# Patient Record
Sex: FEMALE | Race: OTHER | HISPANIC OR LATINO
[De-identification: names, ages, dates, MRNs, and addresses within clinical notes are randomized per-mention and may not be internally consistent; named-entity substitution may affect disease eponyms.]

---

## 2021-08-26 PROBLEM — Z00.00 ENCOUNTER FOR PREVENTIVE HEALTH EXAMINATION: Status: ACTIVE | Noted: 2021-08-26

## 2021-09-24 ENCOUNTER — APPOINTMENT (OUTPATIENT)
Dept: PLASTIC SURGERY | Facility: CLINIC | Age: 31
End: 2021-09-24
Payer: COMMERCIAL

## 2021-09-24 DIAGNOSIS — Z21 ASYMPTOMATIC HUMAN IMMUNODEFICIENCY VIRUS [HIV] INFECTION STATUS: ICD-10-CM

## 2021-09-24 PROCEDURE — 99203 OFFICE O/P NEW LOW 30 MIN: CPT

## 2021-09-29 PROBLEM — Z21 HIV POSITIVE: Status: ACTIVE | Noted: 2021-09-29

## 2021-09-29 RX ORDER — SPIRONOLACTONE 50 MG/1
TABLET ORAL
Refills: 0 | Status: ACTIVE | COMMUNITY

## 2021-09-29 RX ORDER — BICTEGRAVIR SODIUM, EMTRICITABINE, AND TENOFOVIR ALAFENAMIDE FUMARATE 30; 120; 15 MG/1; MG/1; MG/1
TABLET ORAL
Refills: 0 | Status: ACTIVE | COMMUNITY

## 2021-09-29 RX ORDER — ESTRADIOL VALERATE 20 MG/ML
20 INJECTION INTRAMUSCULAR
Refills: 0 | Status: ACTIVE | COMMUNITY

## 2021-09-29 NOTE — HISTORY OF PRESENT ILLNESS
[FreeTextEntry1] : 32yo transgender male to female patient with a history of gender dysphoria presents for consultation for facial feminization.  Patient reports that she has been socially transitioning since March 2019 and she has been medically transitioning, taking hormones since April 2019.  She is currently in transgender care at [facility].  Patient had a tracheal shave and vocal cord surgery August 19, 2021 at [facility].  She denies other gender affirming surgeries and procedures.  Past surgical history includes an appendectomy and wisdom tooth removal.  Patient denies problems with anesthesia or clots in the past.  There is no pertinent family medical history.  Her past medical history includes HIV which was diagnosed in 2014.  Currently her viral load is undetectable.  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  She denies the use of Tobacco, marijuana, and other illicit drugs.  She reports occasional social alcohol use.\par

## 2021-10-01 ENCOUNTER — APPOINTMENT (OUTPATIENT)
Dept: PLASTIC SURGERY | Facility: CLINIC | Age: 31
End: 2021-10-01

## 2021-10-15 ENCOUNTER — RESULT REVIEW (OUTPATIENT)
Age: 31
End: 2021-10-15

## 2021-10-15 ENCOUNTER — APPOINTMENT (OUTPATIENT)
Dept: CT IMAGING | Facility: CLINIC | Age: 31
End: 2021-10-15
Payer: COMMERCIAL

## 2021-10-15 ENCOUNTER — OUTPATIENT (OUTPATIENT)
Dept: OUTPATIENT SERVICES | Facility: HOSPITAL | Age: 31
LOS: 1 days | End: 2021-10-15

## 2021-10-15 PROCEDURE — 70486 CT MAXILLOFACIAL W/O DYE: CPT | Mod: 26

## 2021-11-16 ENCOUNTER — TRANSCRIPTION ENCOUNTER (OUTPATIENT)
Age: 31
End: 2021-11-16

## 2021-11-19 ENCOUNTER — APPOINTMENT (OUTPATIENT)
Dept: PLASTIC SURGERY | Facility: CLINIC | Age: 31
End: 2021-11-19
Payer: COMMERCIAL

## 2021-11-19 PROCEDURE — 99213 OFFICE O/P EST LOW 20 MIN: CPT

## 2022-01-07 ENCOUNTER — APPOINTMENT (OUTPATIENT)
Dept: PLASTIC SURGERY | Facility: CLINIC | Age: 32
End: 2022-01-07
Payer: COMMERCIAL

## 2022-01-07 PROCEDURE — 99213 OFFICE O/P EST LOW 20 MIN: CPT

## 2022-01-07 NOTE — HISTORY OF PRESENT ILLNESS
[FreeTextEntry1] : 30yo transgender male to female patient with a history of gender dysphoria presents to discuss surgical plan prior to FFS scheduled for 2-14-21. no changes to PMH. \par plan for fat grafting to zygoma, \par genioplasty, reductive 6mm and advance\par open rhinoplasty\par brow lift \par \par \par 20 min spent discussing surgical plan\par

## 2022-01-20 ENCOUNTER — APPOINTMENT (OUTPATIENT)
Dept: PLASTIC SURGERY | Facility: CLINIC | Age: 32
End: 2022-01-20
Payer: COMMERCIAL

## 2022-01-20 VITALS
SYSTOLIC BLOOD PRESSURE: 106 MMHG | HEIGHT: 65 IN | WEIGHT: 121 LBS | TEMPERATURE: 97.8 F | RESPIRATION RATE: 16 BRPM | OXYGEN SATURATION: 99 % | DIASTOLIC BLOOD PRESSURE: 72 MMHG | HEART RATE: 74 BPM | BODY MASS INDEX: 20.16 KG/M2

## 2022-01-20 PROCEDURE — 99214 OFFICE O/P EST MOD 30 MIN: CPT

## 2022-01-25 ENCOUNTER — TRANSCRIPTION ENCOUNTER (OUTPATIENT)
Age: 32
End: 2022-01-25

## 2022-01-28 ENCOUNTER — TRANSCRIPTION ENCOUNTER (OUTPATIENT)
Age: 32
End: 2022-01-28

## 2022-02-25 ENCOUNTER — APPOINTMENT (OUTPATIENT)
Dept: PLASTIC SURGERY | Facility: CLINIC | Age: 32
End: 2022-02-25
Payer: COMMERCIAL

## 2022-02-25 PROCEDURE — 99212 OFFICE O/P EST SF 10 MIN: CPT

## 2022-02-25 NOTE — HISTORY OF PRESENT ILLNESS
[FreeTextEntry1] : 32yo transgender female with FFS date planned for 4/22\par pt interested in adding forehead, orbital remodeling and brow lift\par these areas are also a source of dysphoria for her\par \par no change in pmh/psh\par nkda

## 2022-03-31 ENCOUNTER — APPOINTMENT (OUTPATIENT)
Dept: PLASTIC SURGERY | Facility: CLINIC | Age: 32
End: 2022-03-31
Payer: COMMERCIAL

## 2022-03-31 VITALS
WEIGHT: 124 LBS | OXYGEN SATURATION: 99 % | RESPIRATION RATE: 16 BRPM | BODY MASS INDEX: 20.66 KG/M2 | HEART RATE: 82 BPM | SYSTOLIC BLOOD PRESSURE: 105 MMHG | DIASTOLIC BLOOD PRESSURE: 63 MMHG | TEMPERATURE: 98.4 F | HEIGHT: 65 IN

## 2022-03-31 PROCEDURE — 99214 OFFICE O/P EST MOD 30 MIN: CPT

## 2022-03-31 NOTE — PHYSICAL EXAM
[de-identified] : NAD [de-identified] : Normal respiratory effort [de-identified] : Deferred.  Prior photographs reviewed.  Patient endorses no changes.

## 2022-03-31 NOTE — HISTORY OF PRESENT ILLNESS
[FreeTextEntry1] : The patient returns for further discussion of implant type and technique.  She is interested in 250 to 300 cc silicone implants, with a style on the softer side.  We reviewed the patient decision checklist in detail.

## 2022-03-31 NOTE — ASSESSMENT
[FreeTextEntry1] : The patient is interested in breast augmentation with silicone implants.  Plan for 250-300 cc. Awaiting labs from PMD not in screenshot format.

## 2022-04-08 VITALS
RESPIRATION RATE: 16 BRPM | TEMPERATURE: 98 F | DIASTOLIC BLOOD PRESSURE: 72 MMHG | HEIGHT: 65 IN | HEART RATE: 84 BPM | WEIGHT: 123.9 LBS | SYSTOLIC BLOOD PRESSURE: 104 MMHG

## 2022-04-08 NOTE — ASU PATIENT PROFILE, ADULT - BILL OF RIGHTS/ADMISSION INFORMATION PROVIDED TO:
Patient request, last OV 1/15/19, next scheduled 4/15/19. Requested Prescriptions     Pending Prescriptions Disp Refills    hydroCHLOROthiazide (HYDRODIURIL) 12.5 mg tablet       Sig: Take 1 Tab by mouth daily.  pravastatin (PRAVACHOL) 20 mg tablet       Sig: Take 1 Tab by mouth nightly. Patient

## 2022-04-08 NOTE — ASU PATIENT PROFILE, ADULT - NSICDXPASTSURGICALHX_GEN_ALL_CORE_FT
PAST SURGICAL HISTORY:  Elective surgery Wendler glottoplasty    Elective surgery Tracheal shave; voice femenization surgery    S/P appy

## 2022-04-10 ENCOUNTER — TRANSCRIPTION ENCOUNTER (OUTPATIENT)
Age: 32
End: 2022-04-10

## 2022-04-11 ENCOUNTER — APPOINTMENT (OUTPATIENT)
Dept: PLASTIC SURGERY | Facility: HOSPITAL | Age: 32
End: 2022-04-11
Payer: COMMERCIAL

## 2022-04-11 ENCOUNTER — INPATIENT (INPATIENT)
Facility: HOSPITAL | Age: 32
LOS: 0 days | Discharge: HOME | DRG: 876 | End: 2022-04-12
Attending: SURGERY | Admitting: SURGERY
Payer: COMMERCIAL

## 2022-04-11 DIAGNOSIS — Z90.49 ACQUIRED ABSENCE OF OTHER SPECIFIED PARTS OF DIGESTIVE TRACT: Chronic | ICD-10-CM

## 2022-04-11 DIAGNOSIS — Z41.9 ENCOUNTER FOR PROCEDURE FOR PURPOSES OTHER THAN REMEDYING HEALTH STATE, UNSPECIFIED: Chronic | ICD-10-CM

## 2022-04-11 LAB — SARS-COV-2 N GENE NPH QL NAA+PROBE: NOT DETECTED

## 2022-04-11 PROCEDURE — 21172 RCNST SUPR-LAT ORB RM&LW FHD: CPT

## 2022-04-11 PROCEDURE — 21122 GENIOP SLDG OSTEOT 2/>: CPT

## 2022-04-11 PROCEDURE — 30410 RECONSTRUCTION OF NOSE: CPT

## 2022-04-11 PROCEDURE — 21256 RECONSTRUCTION OF ORBIT: CPT

## 2022-04-11 PROCEDURE — 21139 RDCTJ FOREHEAD CNTRG&SETBACK: CPT

## 2022-04-11 PROCEDURE — 15773 GRFG AUTOL FAT LIPO 25 CC/<: CPT

## 2022-04-11 PROCEDURE — 30520 REPAIR OF NASAL SEPTUM: CPT

## 2022-04-11 PROCEDURE — 67900 REPAIR BROW DEFECT: CPT

## 2022-04-11 DEVICE — GUIDE MARKING CRANIAL: Type: IMPLANTABLE DEVICE | Status: FUNCTIONAL

## 2022-04-11 DEVICE — GUIDE MARKING TRANSFORM XS: Type: IMPLANTABLE DEVICE | Status: FUNCTIONAL

## 2022-04-11 DEVICE — GUIDE CUTTING W/PLAN TRANSFORM: Type: IMPLANTABLE DEVICE | Status: FUNCTIONAL

## 2022-04-11 DEVICE — SCREW CR DRV 2.0X11MM: Type: IMPLANTABLE DEVICE | Status: FUNCTIONAL

## 2022-04-11 DEVICE — GUIDE CUTTING TRANSFORM XS: Type: IMPLANTABLE DEVICE | Status: FUNCTIONAL

## 2022-04-11 DEVICE — IMP CUSTOM IPS TI 67MM: Type: IMPLANTABLE DEVICE | Status: FUNCTIONAL

## 2022-04-11 RX ORDER — HYDROMORPHONE HYDROCHLORIDE 2 MG/ML
0.25 INJECTION INTRAMUSCULAR; INTRAVENOUS; SUBCUTANEOUS
Refills: 0 | Status: DISCONTINUED | OUTPATIENT
Start: 2022-04-11 | End: 2022-04-12

## 2022-04-11 RX ORDER — SODIUM CHLORIDE 9 MG/ML
1000 INJECTION, SOLUTION INTRAVENOUS
Refills: 0 | Status: DISCONTINUED | OUTPATIENT
Start: 2022-04-11 | End: 2022-04-12

## 2022-04-11 RX ORDER — KETAMINE HYDROCHLORIDE 100 MG/ML
50 INJECTION INTRAMUSCULAR; INTRAVENOUS ONCE
Refills: 0 | Status: DISCONTINUED | OUTPATIENT
Start: 2022-04-11 | End: 2022-04-12

## 2022-04-11 RX ORDER — CEFAZOLIN SODIUM 1 G
2000 VIAL (EA) INJECTION EVERY 8 HOURS
Refills: 0 | Status: DISCONTINUED | OUTPATIENT
Start: 2022-04-11 | End: 2022-04-11

## 2022-04-11 RX ORDER — OXYCODONE HYDROCHLORIDE 5 MG/1
10 TABLET ORAL EVERY 12 HOURS
Refills: 0 | Status: DISCONTINUED | OUTPATIENT
Start: 2022-04-11 | End: 2022-04-12

## 2022-04-11 RX ORDER — CEFAZOLIN SODIUM 1 G
2000 VIAL (EA) INJECTION EVERY 8 HOURS
Refills: 0 | Status: DISCONTINUED | OUTPATIENT
Start: 2022-04-11 | End: 2022-04-12

## 2022-04-11 RX ORDER — OXYCODONE HYDROCHLORIDE 5 MG/1
5 TABLET ORAL EVERY 4 HOURS
Refills: 0 | Status: DISCONTINUED | OUTPATIENT
Start: 2022-04-11 | End: 2022-04-12

## 2022-04-11 RX ORDER — CHLORHEXIDINE GLUCONATE 213 G/1000ML
15 SOLUTION TOPICAL
Refills: 0 | Status: DISCONTINUED | OUTPATIENT
Start: 2022-04-11 | End: 2022-04-12

## 2022-04-11 RX ORDER — ONDANSETRON 8 MG/1
4 TABLET, FILM COATED ORAL EVERY 8 HOURS
Refills: 0 | Status: DISCONTINUED | OUTPATIENT
Start: 2022-04-11 | End: 2022-04-12

## 2022-04-11 RX ORDER — ACETAMINOPHEN 500 MG
975 TABLET ORAL EVERY 6 HOURS
Refills: 0 | Status: DISCONTINUED | OUTPATIENT
Start: 2022-04-11 | End: 2022-04-12

## 2022-04-11 RX ORDER — BENZOCAINE AND MENTHOL 5; 1 G/100ML; G/100ML
1 LIQUID ORAL THREE TIMES A DAY
Refills: 0 | Status: DISCONTINUED | OUTPATIENT
Start: 2022-04-11 | End: 2022-04-12

## 2022-04-11 RX ORDER — METOCLOPRAMIDE HCL 10 MG
5 TABLET ORAL EVERY 6 HOURS
Refills: 0 | Status: DISCONTINUED | OUTPATIENT
Start: 2022-04-11 | End: 2022-04-12

## 2022-04-11 RX ORDER — DEXAMETHASONE 0.5 MG/5ML
10 ELIXIR ORAL EVERY 12 HOURS
Refills: 0 | Status: DISCONTINUED | OUTPATIENT
Start: 2022-04-11 | End: 2022-04-12

## 2022-04-11 RX ADMIN — HYDROMORPHONE HYDROCHLORIDE 0.25 MILLIGRAM(S): 2 INJECTION INTRAMUSCULAR; INTRAVENOUS; SUBCUTANEOUS at 13:40

## 2022-04-11 RX ADMIN — Medication 5 MILLIGRAM(S): at 18:28

## 2022-04-11 RX ADMIN — CHLORHEXIDINE GLUCONATE 15 MILLILITER(S): 213 SOLUTION TOPICAL at 17:31

## 2022-04-11 RX ADMIN — Medication 975 MILLIGRAM(S): at 23:35

## 2022-04-11 RX ADMIN — Medication 102 MILLIGRAM(S): at 17:30

## 2022-04-11 RX ADMIN — ONDANSETRON 4 MILLIGRAM(S): 8 TABLET, FILM COATED ORAL at 14:15

## 2022-04-11 RX ADMIN — Medication 2000 MILLIGRAM(S): at 22:07

## 2022-04-11 RX ADMIN — Medication 975 MILLIGRAM(S): at 18:20

## 2022-04-11 RX ADMIN — Medication 975 MILLIGRAM(S): at 17:30

## 2022-04-11 RX ADMIN — HYDROMORPHONE HYDROCHLORIDE 0.25 MILLIGRAM(S): 2 INJECTION INTRAMUSCULAR; INTRAVENOUS; SUBCUTANEOUS at 13:55

## 2022-04-11 NOTE — BRIEF OPERATIVE NOTE - NSICDXBRIEFPROCEDURE_GEN_ALL_CORE_FT
PROCEDURES:  Contouring, forehead, anterior frontal sinus wall, for reduction and setback 11-Apr-2022 12:24:27  Tino Mix  Genioplasty with multiple sliding osteotomies 11-Apr-2022 12:24:36  Tino Mix  Rhinoplasty with repair of nasal septum 11-Apr-2022 12:24:59  Tino Mix

## 2022-04-11 NOTE — BRIEF OPERATIVE NOTE - OPERATION/FINDINGS
frontal bone contouring, brow lift, osseous genioplasty, septo-rhinoplasty, fat grafting from abdomen to face

## 2022-04-12 ENCOUNTER — TRANSCRIPTION ENCOUNTER (OUTPATIENT)
Age: 32
End: 2022-04-12

## 2022-04-12 VITALS
RESPIRATION RATE: 18 BRPM | OXYGEN SATURATION: 96 % | SYSTOLIC BLOOD PRESSURE: 119 MMHG | DIASTOLIC BLOOD PRESSURE: 71 MMHG | TEMPERATURE: 98 F | HEART RATE: 91 BPM

## 2022-04-12 PROCEDURE — C1889: CPT

## 2022-04-12 PROCEDURE — C1713: CPT

## 2022-04-12 RX ORDER — ACETAMINOPHEN 500 MG
3 TABLET ORAL
Qty: 0 | Refills: 0 | DISCHARGE
Start: 2022-04-12

## 2022-04-12 RX ORDER — CHLORHEXIDINE GLUCONATE 213 G/1000ML
15 SOLUTION TOPICAL
Qty: 315 | Refills: 0
Start: 2022-04-12 | End: 2022-04-18

## 2022-04-12 RX ORDER — OXYCODONE HYDROCHLORIDE 5 MG/1
1 TABLET ORAL
Qty: 20 | Refills: 0
Start: 2022-04-12 | End: 2022-04-16

## 2022-04-12 RX ORDER — BICTEGRAVIR SODIUM, EMTRICITABINE, AND TENOFOVIR ALAFENAMIDE FUMARATE 30; 120; 15 MG/1; MG/1; MG/1
1 TABLET ORAL DAILY
Refills: 0 | Status: DISCONTINUED | OUTPATIENT
Start: 2022-04-12 | End: 2022-04-12

## 2022-04-12 RX ORDER — SPIRONOLACTONE 25 MG/1
50 TABLET, FILM COATED ORAL DAILY
Refills: 0 | Status: DISCONTINUED | OUTPATIENT
Start: 2022-04-12 | End: 2022-04-12

## 2022-04-12 RX ADMIN — Medication 102 MILLIGRAM(S): at 05:19

## 2022-04-12 RX ADMIN — Medication 975 MILLIGRAM(S): at 18:38

## 2022-04-12 RX ADMIN — Medication 975 MILLIGRAM(S): at 06:19

## 2022-04-12 RX ADMIN — Medication 975 MILLIGRAM(S): at 18:01

## 2022-04-12 RX ADMIN — Medication 975 MILLIGRAM(S): at 00:35

## 2022-04-12 RX ADMIN — Medication 2000 MILLIGRAM(S): at 05:20

## 2022-04-12 RX ADMIN — Medication 975 MILLIGRAM(S): at 11:34

## 2022-04-12 RX ADMIN — CHLORHEXIDINE GLUCONATE 15 MILLILITER(S): 213 SOLUTION TOPICAL at 05:20

## 2022-04-12 RX ADMIN — Medication 975 MILLIGRAM(S): at 05:19

## 2022-04-12 RX ADMIN — Medication 2000 MILLIGRAM(S): at 13:39

## 2022-04-12 RX ADMIN — Medication 102 MILLIGRAM(S): at 18:01

## 2022-04-12 RX ADMIN — CHLORHEXIDINE GLUCONATE 15 MILLILITER(S): 213 SOLUTION TOPICAL at 18:01

## 2022-04-12 RX ADMIN — Medication 975 MILLIGRAM(S): at 12:04

## 2022-04-12 RX ADMIN — BICTEGRAVIR SODIUM, EMTRICITABINE, AND TENOFOVIR ALAFENAMIDE FUMARATE 1 TABLET(S): 30; 120; 15 TABLET ORAL at 18:07

## 2022-04-12 NOTE — DISCHARGE NOTE PROVIDER - NSDCMRMEDTOKEN_GEN_ALL_CORE_FT
Biktarvy 50 mg-200 mg-25 mg oral tablet: 1 tab(s) orally once a day  estradiol 2 mg oral tablet: orally 3 times a day  propranolol 20 mg oral tablet:   spironolactone 50 mg oral tablet: orally once a day- 4 tabs    acetaminophen 325 mg oral tablet: 3 tab(s) orally every 8 hours  acetaminophen-oxyCODONE 325 mg-5 mg oral tablet: 1 tab(s) orally every 6 hours, As Needed MDD:6  Augmentin 500 mg-125 mg oral tablet: 1 tab(s) orally 2 times a day   Biktarvy 50 mg-200 mg-25 mg oral tablet: 1 tab(s) orally once a day  estradiol 2 mg oral tablet: orally 3 times a day  Medrol Dosepak 4 mg oral tablet: Take medrol dosepak as directed  Peridex 0.12% mucous membrane liquid: 15 milliliter(s) mucous membrane 3 times a day   propranolol 20 mg oral tablet:   spironolactone 50 mg oral tablet: orally once a day- 4 tabs

## 2022-04-12 NOTE — DISCHARGE NOTE PROVIDER - INSTRUCTIONS
Please follow up with Dr. Cabrales within x1 week after discharge from the hospital. You may call [phone] to schedule an appointment.     Medications:  •A prescription for pain medication will be e-prescribed to your pharmacy.  Please take this as needed for severe pain.  •Do not drive while taking the prescribed pain medication.  •Do not take pain medication on an empty stomach, this may make you nauseous.  •Constipation is not uncommon when taking prescription pain medication.  You may take an OTC stool softener like Dulcolax or Sennakot to help with this.  •You may take over the counter pain medication such as Tylenol (acetaminophen) or Advil (ibuprofen) for pain.  •Please use Peridex rinse- swish and spit twice daily for 7 days.  Activity:  •No bending or heavy lifting.  Keep your head above the level of your heart.  At your first post-op visit we will assess how you are doing and will adjust the restrictions as needed.  •Sleep elevated on at least 2 pillows  Diet:  •	Intraoral incisions:  Clear liquid diet for first 48 hours then may advance to full liquid diet for 7 days.  Then advance to soft diet.  Avoid overly hot, cold, spicy, or acidic foods.  •	Do not drink alcohol in the immediate postoperative period and while taking prescription pain medication.  Wound Care:  • Gently brush teeth and keep mouth clean after eating.  At your first post-op visit we will assess the wound and instruct you of any care needed.  •You may shower.  Let soap and water run over the incisions and pat dry.  •You may gently apply ice pack, or frozen peas to the nose and eye regions.  Apply this for the first 48-72 hours.  20 minutes on, 20 minutes off.  •If you were given a facial/chin garment, please wear this 24/7 until your first postop visit

## 2022-04-12 NOTE — PROGRESS NOTE ADULT - SUBJECTIVE AND OBJECTIVE BOX
SUBJECTIVE: Patient seen and examined bedside by chief resident. Patient states doing well overall with minimal pain. States difficult to sleep overnight given the swelling in the face but no issues with breathing. Tolerating CLD.     ceFAZolin  Injectable. 2000 milliGRAM(s) IV Push every 8 hours    MEDICATIONS  (PRN):  benzocaine 15 mG/menthol 3.6 mG Lozenge 1 Lozenge Oral three times a day PRN Sore Throat  HYDROmorphone  Injectable 0.25 milliGRAM(s) IV Push every 15 minutes PRN pacu pain  metoclopramide Injectable 5 milliGRAM(s) IV Push every 6 hours PRN nausea/vomiting  ondansetron Injectable 4 milliGRAM(s) IV Push every 8 hours PRN Nausea and/or Vomiting  oxyCODONE    IR 5 milliGRAM(s) Oral every 4 hours PRN Moderate Pain (4 - 6)  oxyCODONE    IR 10 milliGRAM(s) Oral every 12 hours PRN Severe Pain (7 - 10)      I&O's Detail    11 Apr 2022 07:01  -  12 Apr 2022 06:57  --------------------------------------------------------  IN:    Lactated Ringers: 1200 mL    Oral Fluid: 860 mL  Total IN: 2060 mL    OUT:    Bulb (mL): 28.5 mL    Voided (mL): 1950 mL  Total OUT: 1978.5 mL    Total NET: 81.5 mL          Vital Signs Last 24 Hrs  T(C): 36.4 (12 Apr 2022 04:14), Max: 36.8 (11 Apr 2022 20:10)  T(F): 97.5 (12 Apr 2022 04:14), Max: 98.3 (11 Apr 2022 20:10)  HR: 87 (12 Apr 2022 04:14) (76 - 91)  BP: 125/77 (12 Apr 2022 04:14) (95/59 - 125/77)  BP(mean): 71 (11 Apr 2022 14:38) (71 - 84)  RR: 18 (12 Apr 2022 04:14) (11 - 18)  SpO2: 95% (12 Apr 2022 04:14) (94% - 100%)    PHYSICAL EXAM:   Gen: NAD, resting comfortably in bed sitting up  HEENT: bulky dressing chin and head with minimal SS staining of chin, mild periorbital/facial edema stable, LISBETH x1 SS  Pulm: no respiratory distress on RA  Ext: WWP, mild L hand edema at infiltrated IV site   Neuro: motor/sensory grossly intact

## 2022-04-12 NOTE — PROGRESS NOTE ADULT - ASSESSMENT
30yo s/p FFS, frontal bone contouring, genioplasty, septorhino, fat grafting from belly to face 4/11/22.     - Continue jaw support   - Continue LISBETH drain x1, drain care   - Continue CLD   - OOBA   - Possible discharge this afternoon vs. tomorrow AM pending progress    Plan discussed with attending and chief resident.   ____________________________________________________   [facility] - Resident   Surgery

## 2022-04-12 NOTE — DISCHARGE NOTE PROVIDER - HOSPITAL COURSE
32 yo transgender female underwent facial feminization surgery including frontal bone contouring, genioplasty, septorhinoplasty, and facial fat grafting. Patient tolerated the procedure well and had uneventful postoperative hospital course.  On the day of the discharge, patient was evaluated and deemed in stable condition to be discharged to home. Follow up in one week with Dr. Cabrales in the office.

## 2022-04-12 NOTE — DISCHARGE NOTE PROVIDER - CARE PROVIDER_API CALL
West Cabrales)  Plastic Surgery  [address] [facility], [place] [address]  Phone: [phone]  Fax: [phone]  Follow Up Time: 1 week

## 2022-04-12 NOTE — DISCHARGE NOTE NURSING/CASE MANAGEMENT/SOCIAL WORK - PATIENT PORTAL LINK FT
You can access the FollowMyHealth Patient Portal offered by [facility] by registering at the following website: [email or web address]. By joining [employer]’s FollowMyHealth portal, you will also be able to view your health information using other applications (apps) compatible with our system.

## 2022-04-13 PROBLEM — F64.9 GENDER IDENTITY DISORDER, UNSPECIFIED: Chronic | Status: ACTIVE | Noted: 2022-04-08

## 2022-04-13 PROBLEM — B20 HUMAN IMMUNODEFICIENCY VIRUS [HIV] DISEASE: Chronic | Status: ACTIVE | Noted: 2022-04-08

## 2022-04-18 DIAGNOSIS — F64.0 TRANSSEXUALISM: ICD-10-CM

## 2022-04-22 ENCOUNTER — APPOINTMENT (OUTPATIENT)
Dept: PLASTIC SURGERY | Facility: CLINIC | Age: 32
End: 2022-04-22
Payer: COMMERCIAL

## 2022-04-22 PROCEDURE — 99024 POSTOP FOLLOW-UP VISIT: CPT

## 2022-05-13 ENCOUNTER — APPOINTMENT (OUTPATIENT)
Dept: PLASTIC SURGERY | Facility: CLINIC | Age: 32
End: 2022-05-13
Payer: COMMERCIAL

## 2022-05-13 PROCEDURE — 99024 POSTOP FOLLOW-UP VISIT: CPT

## 2022-06-06 NOTE — HISTORY OF PRESENT ILLNESS
[FreeTextEntry1] : Dop: 4/11/22 status post facial feminization surgery including frontal sinus reduction and setback, medial orbital rim reduction and remodeling, lateral orbital rim osteotomy and remodeling of frontal bone and lateral orbital rim, bilateral brow lift, open septorhinoplasty, fat grafting to cheeks and osseous reductive genioplasty.  No excessive bleeding. No fevers. No odor. No purulent discharge. No excessive pain.\par \par \par

## 2022-06-15 NOTE — PHYSICAL EXAM
[de-identified] : NAD.  BMI 20.1 [de-identified] : Normal respiratory effort [de-identified] : No dominant masses, skin changes, nipple retraction, or palpable axillary lymphadenopathy. SN->N distance is 18.5 cm on the right and 19.5 cm on the left; width is 11 (13.5 ideal) cm on the right and 12 (14 ideal) cm on the left; N->IMF is 5 cm on the right and 5 cm on the left. There is no ptosis.

## 2022-06-15 NOTE — HISTORY OF PRESENT ILLNESS
[FreeTextEntry1] : 31-year-old woman designated male at birth (Paulina; she/her) presents for consultation for breast augmentation.  She has been on feminizing hormones for 2-1/2 years and has noticed minimal breast growth.  She is looking to maintain a natural appearance.  She denies any lumps, bumps, or other recent changes in her breasts.  She has not had any breast imaging.  She denies any family history of breast cancer.  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  She lives alone and states that her partner, Marv, would be able to assist her after surgery.  She denies nicotine use, drinks some alcohol, and denies recreational drug use.  She is HIV positive on Biktarvy and states that her viral load is undetectable.

## 2022-06-15 NOTE — ADDENDUM
[FreeTextEntry1] : 2022-6-15\par \par The patient has labs from April 2022 demonstrating normal CD4 count and undetectable viral load.  We will place orders for insurance submission.

## 2022-06-15 NOTE — ASSESSMENT
[FreeTextEntry1] : The patient is interested in an augmentation of 250 to 350 cc.  She is uncertain if she wants to use saline or silicone implants.  She'll need to return for an additional appointment for further discussion.  We'll also need a copy of her most recent CD4 count and viral load.  She is interested in reviewing photos of prior breast augmentation patients.

## 2022-07-08 ENCOUNTER — APPOINTMENT (OUTPATIENT)
Dept: PLASTIC SURGERY | Facility: CLINIC | Age: 32
End: 2022-07-08

## 2022-07-08 PROCEDURE — 99024 POSTOP FOLLOW-UP VISIT: CPT

## 2022-07-08 RX ORDER — CHLORHEXIDINE GLUCONATE, 0.12% ORAL RINSE 1.2 MG/ML
0.12 SOLUTION DENTAL
Qty: 1 | Refills: 0 | Status: COMPLETED | COMMUNITY
Start: 2022-04-18 | End: 2022-07-08

## 2022-07-08 NOTE — HISTORY OF PRESENT ILLNESS
[FreeTextEntry1] : Dop: 4/11/22 \par s/o facial feminization surgery including frontal sinus reduction and setback, medial orbital rim reduction and remodeling, lateral orbital rim osteotomy and remodeling of frontal bone and lateral orbital rim, bilateral brow lift, open septorhinoplasty, fat grafting to cheeks and osseous reductive genioplasty. No excessive bleeding. No fevers. No odor. No purulent discharge. No excessive pain.\par \par Pt had breast aug June 2020; KARISHMA Fernandez\par interested in discussing mandibular angle reduction and fillers

## 2022-09-09 ENCOUNTER — APPOINTMENT (OUTPATIENT)
Dept: PLASTIC SURGERY | Facility: CLINIC | Age: 32
End: 2022-09-09

## 2022-09-09 VITALS
OXYGEN SATURATION: 98 % | TEMPERATURE: 97.7 F | HEIGHT: 65 IN | BODY MASS INDEX: 20.83 KG/M2 | DIASTOLIC BLOOD PRESSURE: 74 MMHG | WEIGHT: 125 LBS | HEART RATE: 71 BPM | SYSTOLIC BLOOD PRESSURE: 106 MMHG

## 2022-09-09 PROCEDURE — 99213 OFFICE O/P EST LOW 20 MIN: CPT

## 2022-10-01 NOTE — DISCUSSION/SUMMARY
[FreeTextEntry1] : This patient known to us began transitioning in March 2019\par She has been on hormonal therapy consistently since 2019\par She has been in therapy and was diagnosed with Gender Dysphoria \par underwent previous FFS by Dr. Cabrales but still has concerns about certain facial features that appear masculine \par desires facial feminization surgery\par followed by Transgender team\par The following facial features appear masculine and will need to be modified:\par -Brow\par -Jawline (Mandiblular angles)\par \par PSxHx:\par Surgery: FFS (Brow, nose, chin)\par Year 4/11/2022\par Hospital: [facility]\par Surgeon: Dr. ROZ Cabrales\par Complications: None\par \par Surgery: Tracheal shave\par Year 2021\par Hospital: [facility]\par Surgeon: Dr. JENNY Vance\par Complications: None\par \par Surgery: Breast augmentation\par \par Medications: \par -Estrogen\par -Spironolactone 200mg QD\par  \par FH: noncontributory\par  \par SH: marijuana use,  no secondary tobacco use,\par  \par ROS:\par General health / Constitutional\par      no fever, no chills, no unusual weight changes, no energy level changes, no night sweats\par Skin\par       No color or pigmentation changes, no pruritis, no rashes, no ulcers,  \par Hair\par       No changes in color, texture,  distribution, loss\par Nails\par       No color changes, brittleness, infection\par Head\par       No headaches, no new jaw pain\par Eyes\par       Good visual acuity, no color blindness, no corrective lenses, no photophobia, no diplopia, no blurred vision, no infection, pain, no medications, \par Ear\par      no tinnitus, no discharge, no pain, no medications\par Nose\par      no epistaxis, no rhinorrhea, no rhinitis, no pain, \par Mouth & Throat\par      no gingivitis, no gingival bleeding, no ulcers, no voice changes, no changes in oral mucosa or tongue\par Neck\par      no stiffness, no pain, no lymphadenitis, no thyroid disorders, \par Respiratory\par      no cough, no dyspnea, no wheezing,  no chest pain, cyanosis, no pneumonia, no asthma, \par Cardiovascular\par      no chest pain, no palpitations, no irregular rhythm, no tachycardia, no bradycardia, no heart failure, no dyspnea on exertion (GILMAN), no edema\par Gastrointestinal\par      no nausea / vomiting, no dysphagia, no reflux / GERD, no abdominal pain, no jaundice\par Musculoskeletal\par      no pain in muscles, bones, or joints; no fractures, no dislocations, no muscular weakness, no atrophy\par Neurological\par      no paresis, no paralysis, no paresthesia, no seizures, no dizziness, no syncope, no ataxia, no tremor\par Psychological\par      no childhood behavioral problems, no irritability, no sleep changes\par Hematological\par      no anemia, no bruising, no bleeding tendencies, \par  \par PHYSICAL EXAM\par General\par WDWN, in no distress,  A & O x 3 (person, place, time) \par HEENT\par Head: AT/NC (atraumatic, normocephalic), including TMJ, sensory and motor; \par Prominent brow and lateral orbital rim type III\par Eyes: EOMI, PERRLA\par Ears: exterior, nl hearing,\par Nose: slightly deviated to the left\par Throat & mouth: gd palate elevation, nl tongue mobility, nl tonsil size\par Prominent mandibular angle with active masseter\par  \par Neck: no masses, nl pulses\par  \par We had a 25 minute meeting with the patient discussing diagnosis and medical management issues and outcomes.\par First stage FFS:\par -Brow lift\par -frontal sinus setback\par -supraorbital brow recontouring\par -mandibular angle reduction b/l\par \par Needs a 3D CT scan and Virtual Surgical Planning\par She will need to provide a letter from her therapist and hormone provider\par \par CPT 21139: Frontal sinus setback\par CPT 25146: Orbital reconstruction\par CPT 66252: Bilateral browlift, Hairline lowering\par CPT 21172: Supraorbital contouring\par CPT 46107: bilateral tarsorrhaphy\par CPT 21209 Mandibular angle resection\par CPT 21127: Mandibular reconstruction autologous tissue\par \par The following feature modifications will be requested based on the below medical necessity reasons: \par \par 21139 (Frontal sinus setback): Previous exposure to testosterone has led this patient to have a male appearing forehead with a more bossed shaped; this is opposed to a female appearing forehead that is more flat. A frontal sinus setback procedure will reshape the anterior wall of the frontal sinus by pushing back the bone and change the contour from ‘convex’ (male-shape) to ‘flat’ (female-shape). This surgical change will create a more flattened feminine brow appearance.\par \par 85095 (Browlift): Previous exposure to testosterone has led to the patient having a male ‘M-shaped’ hairline as opposed to a female ‘upside-down U-shaped’ hairline. Her receded hairline also creates a high, broad male appearing forehead. Her low set eyebrows on top of her orbital roof rim give her a juan, male-appearing look. Both of these male traits: a high hairline and low-set brows are causing her intense feelings of dysphoria. She would benefit from bilateral browlift and hairline lowering procedures. Raising her lateral eyebrow with a bilateral browlift will create a more female appearance. She has stressed a strong desire to wear her own natural hair and does not want to always have to wear a wig to cover up her male features.\par \par 56657 (bilateral orbital reconstruction): The bone growth that occurred during testosterone exposure in the upper half of each orbital has caused this patient to have male appearing orbital features that contribute to the sense of dysphoria she feels in public. Orbital reconstruction with a reciprocating saw for an “L-shaped” ostectomy, on both sides will help remove the excessive bony protrusion; this will be followed by bone material grafting and resorbable plate fixation. The bilateral orbital reconstruction will help alleviate these orbital and upper facial male features.\par \par 74486 (Supraorbital bar contouring): This patient has orbital lateral hooding or overhang of her lateral frontal bone which is typically associated with the male skull and orbits. Supraorbital contouring of this lateral orbital region with a pineapple tabatha will correct this male feature that is causing this patient dysphoria. These procedures also allows us to do a success browlift procedure since the overhang of bone can inhibit the upper movement of the brow and the removal of this allows for an effective lift.\par \par 66323 (Tarsorrhaphy): Bilateral tarsorrhaphy or surgical closure of both eyelids, after protective lacrilube or perilube ointment is applied, is necessary for the safety of the patient’s globes. With the brow reshaping and browlift procedure the upper eyelid will be pulled up so the globe will be exposed and unprotected during this long, proposed procedure. The tarsorrhaphies, allows both globes to be protected so the complications of corneal abrasions may be prevented.\par \par 85538 (mandibular angle resection/reduction): This patient has an angular, more boxy jaw which results in male appearing lower face. She also has increased lower facial width related to her mandibular angle lateral projection. Mandibular angle resection/reduction will create a more feminine triangular jaw. By having a mandibular angle reduction, the lower facial width is narrowed and this will create a “V-shaped”, feminine appearance of the jawline when viewed from the front.\par \par 06188 (Reconstruction of lower jaw with autologous tissue): This patient’s wide, “U-shaped” lower jaw accounts for public criticism related to male facial features. We propose reconstruction of the lower jaw with bone graft material layered to form a more feminine shape. The goal of this autologous tissue reconstructive procedure is to achieve a tapered, feminine lower jaw.\par \par \par \par \par \par \par \par \par \par \par

## 2022-10-31 ENCOUNTER — APPOINTMENT (OUTPATIENT)
Dept: PLASTIC SURGERY | Facility: CLINIC | Age: 32
End: 2022-10-31
Payer: COMMERCIAL

## 2022-10-31 PROCEDURE — XXXXX: CPT | Mod: 1L

## 2022-11-02 LAB — SARS-COV-2 N GENE NPH QL NAA+PROBE: NOT DETECTED

## 2022-11-02 NOTE — HISTORY OF PRESENT ILLNESS
[FreeTextEntry1] : JESSICA is a 32 year old transgender female that presents on a telehealth call today for a pre operative appointment to discuss upcoming revision facial feminization surgery. Patient denies any new significant concerns or complaints.

## 2022-11-02 NOTE — PHYSICAL EXAM
[de-identified] : Alert, calm, cooperative.\par  [de-identified] : + Prominent brow and lateral orbital rim type III\par  [de-identified] : + Prominent mandibular angle with active masseter\par \par \par  [de-identified] : Respirations even and unlabored.\par

## 2022-11-03 ENCOUNTER — TRANSCRIPTION ENCOUNTER (OUTPATIENT)
Age: 32
End: 2022-11-03

## 2022-11-03 VITALS
HEART RATE: 65 BPM | WEIGHT: 124.12 LBS | TEMPERATURE: 97 F | DIASTOLIC BLOOD PRESSURE: 74 MMHG | OXYGEN SATURATION: 98 % | RESPIRATION RATE: 16 BRPM | SYSTOLIC BLOOD PRESSURE: 122 MMHG | HEIGHT: 65 IN

## 2022-11-03 LAB
ALBUMIN SERPL ELPH-MCNC: 4.6 G/DL
ALP BLD-CCNC: 57 U/L
ALT SERPL-CCNC: 16 U/L
ANION GAP SERPL CALC-SCNC: 12 MMOL/L
AST SERPL-CCNC: 18 U/L
BASOPHILS # BLD AUTO: 0.02 K/UL
BASOPHILS NFR BLD AUTO: 0.4 %
BILIRUB SERPL-MCNC: 0.4 MG/DL
BUN SERPL-MCNC: 16 MG/DL
CALCIUM SERPL-MCNC: 9.6 MG/DL
CHLORIDE SERPL-SCNC: 101 MMOL/L
CO2 SERPL-SCNC: 25 MMOL/L
CREAT SERPL-MCNC: 0.97 MG/DL
EGFR: 80 ML/MIN/1.73M2
EOSINOPHIL # BLD AUTO: 0.07 K/UL
EOSINOPHIL NFR BLD AUTO: 1.5 %
GLUCOSE SERPL-MCNC: 111 MG/DL
HCT VFR BLD CALC: 38.6 %
HGB BLD-MCNC: 13.2 G/DL
HIV1 RNA # SERPL NAA+PROBE: NORMAL
HIV1 RNA # SERPL NAA+PROBE: NORMAL COPIES/ML
IMM GRANULOCYTES NFR BLD AUTO: 0.2 %
INR PPP: 1.09 RATIO
LYMPHOCYTES # BLD AUTO: 1.48 K/UL
LYMPHOCYTES NFR BLD AUTO: 31.6 %
MAN DIFF?: NORMAL
MCHC RBC-ENTMCNC: 34.2 GM/DL
MCHC RBC-ENTMCNC: 34.4 PG
MCV RBC AUTO: 100.5 FL
MONOCYTES # BLD AUTO: 0.49 K/UL
MONOCYTES NFR BLD AUTO: 10.4 %
NEUTROPHILS # BLD AUTO: 2.62 K/UL
NEUTROPHILS NFR BLD AUTO: 55.9 %
PLATELET # BLD AUTO: 229 K/UL
POTASSIUM SERPL-SCNC: 4.2 MMOL/L
PROT SERPL-MCNC: 7 G/DL
PT BLD: 12.9 SEC
RBC # BLD: 3.84 M/UL
RBC # FLD: 12.3 %
SODIUM SERPL-SCNC: 138 MMOL/L
VIRAL LOAD INTERP: NORMAL
VIRAL LOAD LOG: NORMAL LG COP/ML
WBC # FLD AUTO: 4.69 K/UL

## 2022-11-03 RX ORDER — PROPRANOLOL HCL 160 MG
0 CAPSULE, EXTENDED RELEASE 24HR ORAL
Qty: 0 | Refills: 0 | DISCHARGE

## 2022-11-03 NOTE — ASU PATIENT PROFILE, ADULT - NSICDXPASTSURGICALHX_GEN_ALL_CORE_FT
PAST SURGICAL HISTORY:  Elective surgery Wendler glottoplasty    Elective surgery Tracheal shave; voice femenization surgery    S/P appy      PAST SURGICAL HISTORY:  Elective surgery Wendler glottoplasty @  [facility] 2021    [id number]    Elective surgery facial feminization April 2022 @ [facility]    S/P appy

## 2022-11-03 NOTE — ASU PATIENT PROFILE, ADULT - FALL HARM RISK - UNIVERSAL INTERVENTIONS
Bed in lowest position, wheels locked, appropriate side rails in place/Call bell, personal items and telephone in reach/Instruct patient to call for assistance before getting out of bed or chair/Non-slip footwear when patient is out of bed/[place] to call system/Physically safe environment - no spills, clutter or unnecessary equipment/Purposeful Proactive Rounding/Room/bathroom lighting operational, light cord in reach

## 2022-11-04 ENCOUNTER — INPATIENT (INPATIENT)
Facility: HOSPITAL | Age: 32
LOS: 0 days | Discharge: HOME | DRG: 876 | End: 2022-11-05
Attending: SURGERY | Admitting: SURGERY
Payer: COMMERCIAL

## 2022-11-04 ENCOUNTER — TRANSCRIPTION ENCOUNTER (OUTPATIENT)
Age: 32
End: 2022-11-04

## 2022-11-04 ENCOUNTER — APPOINTMENT (OUTPATIENT)
Dept: PLASTIC SURGERY | Facility: HOSPITAL | Age: 32
End: 2022-11-04

## 2022-11-04 DIAGNOSIS — Z90.49 ACQUIRED ABSENCE OF OTHER SPECIFIED PARTS OF DIGESTIVE TRACT: Chronic | ICD-10-CM

## 2022-11-04 DIAGNOSIS — Z41.9 ENCOUNTER FOR PROCEDURE FOR PURPOSES OTHER THAN REMEDYING HEALTH STATE, UNSPECIFIED: Chronic | ICD-10-CM

## 2022-11-04 PROCEDURE — 21139 RDCTJ FOREHEAD CNTRG&SETBACK: CPT

## 2022-11-04 PROCEDURE — 15824 RHYTIDECTOMY FOREHEAD: CPT

## 2022-11-04 PROCEDURE — 21256 RECONSTRUCTION OF ORBIT: CPT

## 2022-11-04 PROCEDURE — 21127 AUGMENTATION MNDBLR B1 GRF: CPT

## 2022-11-04 PROCEDURE — 21209 REDUCTION OF FACIAL BONES: CPT

## 2022-11-04 PROCEDURE — 21172 RCNST SUPR-LAT ORB RM&LW FHD: CPT

## 2022-11-04 DEVICE — GUIDE MARKING W/PLAN TRANSFORM: Type: IMPLANTABLE DEVICE | Status: FUNCTIONAL

## 2022-11-04 DEVICE — SCREW EMERG CROSS DRIVE TI 2X9: Type: IMPLANTABLE DEVICE | Status: FUNCTIONAL

## 2022-11-04 DEVICE — GUIDE MARKING TRANSFORM XS: Type: IMPLANTABLE DEVICE | Status: FUNCTIONAL

## 2022-11-04 RX ORDER — SPIRONOLACTONE 25 MG/1
0 TABLET, FILM COATED ORAL
Qty: 0 | Refills: 0 | DISCHARGE

## 2022-11-04 RX ORDER — BENZOCAINE AND MENTHOL 5; 1 G/100ML; G/100ML
1 LIQUID ORAL
Refills: 0 | Status: DISCONTINUED | OUTPATIENT
Start: 2022-11-04 | End: 2022-11-05

## 2022-11-04 RX ORDER — CHLORHEXIDINE GLUCONATE, 0.12% ORAL RINSE 1.2 MG/ML
0.12 SOLUTION DENTAL
Qty: 1 | Refills: 0 | Status: ACTIVE | COMMUNITY
Start: 2022-11-04 | End: 1900-01-01

## 2022-11-04 RX ORDER — DIAZEPAM 5 MG
5 TABLET ORAL ONCE
Refills: 0 | Status: DISCONTINUED | OUTPATIENT
Start: 2022-11-04 | End: 2022-11-05

## 2022-11-04 RX ORDER — ACETAMINOPHEN 500 MG
975 TABLET ORAL ONCE
Refills: 0 | Status: DISCONTINUED | OUTPATIENT
Start: 2022-11-04 | End: 2022-11-05

## 2022-11-04 RX ORDER — SODIUM CHLORIDE 9 MG/ML
1000 INJECTION, SOLUTION INTRAVENOUS
Refills: 0 | Status: DISCONTINUED | OUTPATIENT
Start: 2022-11-04 | End: 2022-11-05

## 2022-11-04 RX ORDER — BICTEGRAVIR SODIUM, EMTRICITABINE, AND TENOFOVIR ALAFENAMIDE FUMARATE 30; 120; 15 MG/1; MG/1; MG/1
1 TABLET ORAL
Qty: 0 | Refills: 0 | DISCHARGE

## 2022-11-04 RX ORDER — HYDROMORPHONE HYDROCHLORIDE 2 MG/ML
0.5 INJECTION INTRAMUSCULAR; INTRAVENOUS; SUBCUTANEOUS
Refills: 0 | Status: DISCONTINUED | OUTPATIENT
Start: 2022-11-04 | End: 2022-11-05

## 2022-11-04 RX ORDER — OXYCODONE 5 MG/1
5 TABLET ORAL
Qty: 12 | Refills: 0 | Status: ACTIVE | COMMUNITY
Start: 2022-11-04 | End: 1900-01-01

## 2022-11-04 RX ORDER — OXYCODONE HYDROCHLORIDE 5 MG/1
5 TABLET ORAL ONCE
Refills: 0 | Status: DISCONTINUED | OUTPATIENT
Start: 2022-11-04 | End: 2022-11-04

## 2022-11-04 RX ORDER — ONDANSETRON 8 MG/1
4 TABLET, FILM COATED ORAL EVERY 8 HOURS
Refills: 0 | Status: DISCONTINUED | OUTPATIENT
Start: 2022-11-04 | End: 2022-11-05

## 2022-11-04 RX ORDER — CEFAZOLIN SODIUM 1 G
2000 VIAL (EA) INJECTION ONCE
Refills: 0 | Status: COMPLETED | OUTPATIENT
Start: 2022-11-04 | End: 2022-11-04

## 2022-11-04 RX ORDER — BICTEGRAVIR SODIUM, EMTRICITABINE, AND TENOFOVIR ALAFENAMIDE FUMARATE 30; 120; 15 MG/1; MG/1; MG/1
1 TABLET ORAL DAILY
Refills: 0 | Status: DISCONTINUED | OUTPATIENT
Start: 2022-11-04 | End: 2022-11-05

## 2022-11-04 RX ORDER — DEXAMETHASONE 0.5 MG/5ML
10 ELIXIR ORAL EVERY 12 HOURS
Refills: 0 | Status: DISCONTINUED | OUTPATIENT
Start: 2022-11-04 | End: 2022-11-05

## 2022-11-04 RX ORDER — HYDROMORPHONE HYDROCHLORIDE 2 MG/ML
0.5 INJECTION INTRAMUSCULAR; INTRAVENOUS; SUBCUTANEOUS
Refills: 0 | Status: DISCONTINUED | OUTPATIENT
Start: 2022-11-04 | End: 2022-11-04

## 2022-11-04 RX ORDER — OXYCODONE HYDROCHLORIDE 5 MG/1
10 TABLET ORAL ONCE
Refills: 0 | Status: DISCONTINUED | OUTPATIENT
Start: 2022-11-04 | End: 2022-11-04

## 2022-11-04 RX ORDER — HYDROMORPHONE HYDROCHLORIDE 2 MG/ML
2 INJECTION INTRAMUSCULAR; INTRAVENOUS; SUBCUTANEOUS EVERY 4 HOURS
Refills: 0 | Status: DISCONTINUED | OUTPATIENT
Start: 2022-11-04 | End: 2022-11-05

## 2022-11-04 RX ORDER — ACETAMINOPHEN 500 MG
1000 TABLET ORAL ONCE
Refills: 0 | Status: COMPLETED | OUTPATIENT
Start: 2022-11-04 | End: 2022-11-04

## 2022-11-04 RX ORDER — CALCIUM CARBONATE 500(1250)
1 TABLET ORAL ONCE
Refills: 0 | Status: DISCONTINUED | OUTPATIENT
Start: 2022-11-04 | End: 2022-11-05

## 2022-11-04 RX ORDER — ONDANSETRON 8 MG/1
4 TABLET, FILM COATED ORAL ONCE
Refills: 0 | Status: COMPLETED | OUTPATIENT
Start: 2022-11-04 | End: 2022-11-04

## 2022-11-04 RX ORDER — IBUPROFEN 200 MG
400 TABLET ORAL ONCE
Refills: 0 | Status: DISCONTINUED | OUTPATIENT
Start: 2022-11-04 | End: 2022-11-05

## 2022-11-04 RX ORDER — GABAPENTIN 300 MG/1
300 CAPSULE ORAL
Qty: 10 | Refills: 0 | Status: ACTIVE | COMMUNITY
Start: 2022-11-04 | End: 1900-01-01

## 2022-11-04 RX ADMIN — Medication 1000 MILLIGRAM(S): at 11:55

## 2022-11-04 RX ADMIN — ONDANSETRON 4 MILLIGRAM(S): 8 TABLET, FILM COATED ORAL at 17:41

## 2022-11-04 RX ADMIN — SODIUM CHLORIDE 75 MILLILITER(S): 9 INJECTION, SOLUTION INTRAVENOUS at 11:38

## 2022-11-04 RX ADMIN — Medication 100 MILLIGRAM(S): at 17:41

## 2022-11-04 RX ADMIN — OXYCODONE HYDROCHLORIDE 10 MILLIGRAM(S): 5 TABLET ORAL at 16:25

## 2022-11-04 RX ADMIN — OXYCODONE HYDROCHLORIDE 10 MILLIGRAM(S): 5 TABLET ORAL at 15:25

## 2022-11-04 RX ADMIN — BICTEGRAVIR SODIUM, EMTRICITABINE, AND TENOFOVIR ALAFENAMIDE FUMARATE 1 TABLET(S): 30; 120; 15 TABLET ORAL at 21:42

## 2022-11-04 RX ADMIN — Medication 400 MILLIGRAM(S): at 11:40

## 2022-11-04 RX ADMIN — Medication 102 MILLIGRAM(S): at 18:22

## 2022-11-04 RX ADMIN — BENZOCAINE AND MENTHOL 1 LOZENGE: 5; 1 LIQUID ORAL at 21:42

## 2022-11-04 NOTE — DISCHARGE NOTE PROVIDER - CARE PROVIDER_API CALL
Valente Polk (MD)  Plastic Surgery; Surgery  [address] [facility] [address], [address]  [place] [address]  Phone: [phone]  Fax: [phone]  Follow Up Time:

## 2022-11-04 NOTE — DISCHARGE NOTE PROVIDER - NSDCMRMEDTOKEN_GEN_ALL_CORE_FT
Biktarvy 50 mg-200 mg-25 mg oral tablet: 1 tab(s) orally once a day  estradiol 2 mg oral tablet: orally 3 times a day  spironolactone 50 mg oral tablet: orally once a day- 4 tabs

## 2022-11-04 NOTE — DISCHARGE NOTE PROVIDER - HOSPITAL COURSE
Pt is a 32 female, assigned male at birth.  She underwent facial feminization surgery with Dr. Polk including, lateral orbital rim contouring, brow lift/forehead reduction, and mandibular angle reduction.  She tolerated the procedure well and recovered without issue in the recovery room.  POD1, she tolerated clear liquid diet, and ambulated, the dressings were removed and replaced, the drain was removed, and she was cleared for discharge.

## 2022-11-04 NOTE — DISCHARGE NOTE PROVIDER - NSDCFUSCHEDAPPT_GEN_ALL_CORE_FT
Valente Polk  [facility] Physician [facility]  PLASTICSUR [address] [place] [address]  Scheduled Appointment: 11/11/2022

## 2022-11-04 NOTE — DISCHARGE NOTE PROVIDER - INSTRUCTIONS
Medications:  •A prescription for pain medication will be e-prescribed to your pharmacy.  Please take this as needed for severe pain.  •Do not drive while taking the prescribed pain medication.  •Do not take pain medication on an empty stomach, this may make you nauseous.  •Constipation is not uncommon when taking prescription pain medication.  You may take an OTC stool softener like Dulcolax or Sennakot to help with this.  •You may take over the counter pain medication such as Tylenol (acetaminophen) or Advil (ibuprofen) for pain.  •Please use Peridex rinse- swish and spit twice daily for 7 days.  Activity:  •No bending or heavy lifting.  Keep your head above the level of your heart.  At your first post-op visit we will assess how you are doing and will adjust the restrictions as needed.  •Sleep elevated on at least 2 pillows  Diet:  •	Intraoral incisions:  Clear liquid diet for first 48 hours then may advance to full liquid diet for 7 days.  Then advance to soft diet.  Avoid overly hot, cold, spicy, or acidic foods.  •	Do not drink alcohol in the immediate postoperative period and while taking prescription pain medication.  Wound Care:  • Gently brush teeth and keep mouth clean after eating.  At your first post-op visit we will assess the wound and instruct you of any care needed.  •You may shower.  Let soap and water run over the incisions and pat dry.  •You may gently apply ice pack, or frozen peas to the nose and eye regions.  Apply this for the first 48-72 hours.  20 minutes on, 20 minutes off.  •If you were given a facial/chin garment, please wear this 24/7 until your first postop visit

## 2022-11-05 ENCOUNTER — TRANSCRIPTION ENCOUNTER (OUTPATIENT)
Age: 32
End: 2022-11-05

## 2022-11-05 VITALS
DIASTOLIC BLOOD PRESSURE: 70 MMHG | OXYGEN SATURATION: 96 % | TEMPERATURE: 97 F | RESPIRATION RATE: 18 BRPM | HEART RATE: 85 BPM | SYSTOLIC BLOOD PRESSURE: 109 MMHG

## 2022-11-05 PROCEDURE — C9143: CPT

## 2022-11-05 PROCEDURE — 86850 RBC ANTIBODY SCREEN: CPT

## 2022-11-05 PROCEDURE — C1889: CPT

## 2022-11-05 PROCEDURE — 86901 BLOOD TYPING SEROLOGIC RH(D): CPT

## 2022-11-05 PROCEDURE — 86900 BLOOD TYPING SEROLOGIC ABO: CPT

## 2022-11-05 RX ADMIN — Medication 102 MILLIGRAM(S): at 06:23

## 2022-11-05 RX ADMIN — BENZOCAINE AND MENTHOL 1 LOZENGE: 5; 1 LIQUID ORAL at 00:44

## 2022-11-05 NOTE — PROGRESS NOTE ADULT - SUBJECTIVE AND OBJECTIVE BOX
well this AM  no concerns  No CP/SOB/LP  Tolerating oral intake  passing gas    O/E   Appears well. Some facial swelling consistent with this early stage of convalescence. Breathing comfortably. Incisions intact.  Some crusting to right eye from lubricant    A/P  OK for DC today  Follow up with Dr. Polk as arranged

## 2022-11-05 NOTE — DISCHARGE NOTE NURSING/CASE MANAGEMENT/SOCIAL WORK - PATIENT PORTAL LINK FT
You can access the FollowMyHealth Patient Portal offered by [facility] by registering at the following website: [email or web address]. By joining [employer]’s FollowMyHealth portal, you will also be able to view your health information using other applications (apps) compatible with our system.

## 2022-11-05 NOTE — DISCHARGE NOTE NURSING/CASE MANAGEMENT/SOCIAL WORK - NSDCPEFALRISK_GEN_ALL_CORE
For information on Fall & Injury Prevention, visit: [email or web address] OR  [email or web address] OR  https://www.cdc.gov/steadi/patient.html

## 2022-11-11 ENCOUNTER — APPOINTMENT (OUTPATIENT)
Dept: PLASTIC SURGERY | Facility: CLINIC | Age: 32
End: 2022-11-11

## 2022-11-11 VITALS
HEIGHT: 65 IN | DIASTOLIC BLOOD PRESSURE: 70 MMHG | BODY MASS INDEX: 20.83 KG/M2 | WEIGHT: 125 LBS | HEART RATE: 90 BPM | SYSTOLIC BLOOD PRESSURE: 115 MMHG | TEMPERATURE: 98.3 F | OXYGEN SATURATION: 99 %

## 2022-11-11 DIAGNOSIS — Z87.890 PERSONAL HISTORY OF SEX REASSIGNMENT: ICD-10-CM

## 2022-11-11 DIAGNOSIS — F64.0 TRANSSEXUALISM: ICD-10-CM

## 2022-12-02 ENCOUNTER — APPOINTMENT (OUTPATIENT)
Dept: PLASTIC SURGERY | Facility: CLINIC | Age: 32
End: 2022-12-02

## 2022-12-02 VITALS
WEIGHT: 120 LBS | HEIGHT: 65 IN | OXYGEN SATURATION: 98 % | RESPIRATION RATE: 16 BRPM | BODY MASS INDEX: 19.99 KG/M2 | TEMPERATURE: 97.8 F | HEART RATE: 69 BPM | DIASTOLIC BLOOD PRESSURE: 63 MMHG | SYSTOLIC BLOOD PRESSURE: 104 MMHG

## 2022-12-02 PROCEDURE — 99024 POSTOP FOLLOW-UP VISIT: CPT

## 2022-12-09 ENCOUNTER — APPOINTMENT (OUTPATIENT)
Dept: PLASTIC SURGERY | Facility: CLINIC | Age: 32
End: 2022-12-09

## 2023-01-06 ENCOUNTER — APPOINTMENT (OUTPATIENT)
Dept: PLASTIC SURGERY | Facility: CLINIC | Age: 33
End: 2023-01-06
Payer: COMMERCIAL

## 2023-01-06 VITALS
HEART RATE: 85 BPM | HEIGHT: 65 IN | BODY MASS INDEX: 19.99 KG/M2 | OXYGEN SATURATION: 97 % | WEIGHT: 120 LBS | DIASTOLIC BLOOD PRESSURE: 71 MMHG | TEMPERATURE: 97.8 F | SYSTOLIC BLOOD PRESSURE: 105 MMHG

## 2023-01-06 PROCEDURE — 99024 POSTOP FOLLOW-UP VISIT: CPT

## 2023-03-05 ENCOUNTER — NON-APPOINTMENT (OUTPATIENT)
Age: 33
End: 2023-03-05

## 2023-07-21 ENCOUNTER — APPOINTMENT (OUTPATIENT)
Dept: PLASTIC SURGERY | Facility: CLINIC | Age: 33
End: 2023-07-21
Payer: COMMERCIAL

## 2023-07-21 VITALS
BODY MASS INDEX: 19.99 KG/M2 | TEMPERATURE: 98 F | SYSTOLIC BLOOD PRESSURE: 107 MMHG | HEIGHT: 65 IN | OXYGEN SATURATION: 99 % | WEIGHT: 120 LBS | DIASTOLIC BLOOD PRESSURE: 70 MMHG | HEART RATE: 73 BPM

## 2023-07-21 DIAGNOSIS — F64.9 GENDER IDENTITY DISORDER, UNSPECIFIED: ICD-10-CM

## 2023-07-21 PROCEDURE — XXXXX: CPT | Mod: 1L

## 2023-07-23 NOTE — HISTORY OF PRESENT ILLNESS
[FreeTextEntry1] : JESSICA is a 33 year old transgender female patient that presents to the office today for a post operative evaluation s/p  Frontal sinus setback, Bilateral orbital reconstruction,  Bilateral brow lift, hairline lowering, Supraorbital contouring,  Mandible angle resection reduction, Mandible reconstruction with autologous tissue on 11/4/2022. Patient is happy with the results. Patient denies having any significant concerns or complaints.\par

## 2023-07-23 NOTE — PHYSICAL EXAM
[de-identified] : Alert, calm, cooperative.\par  [de-identified] : Coronal incision healed appropriately, mild edema but significantly improved. [de-identified] : Respirations even and unlabored.\par  [de-identified] : Oral incisions healed appropriately, mild edema but significantly improved.

## 2023-10-20 ENCOUNTER — APPOINTMENT (OUTPATIENT)
Dept: PLASTIC SURGERY | Facility: CLINIC | Age: 33
End: 2023-10-20
Payer: COMMERCIAL

## 2023-10-20 VITALS
DIASTOLIC BLOOD PRESSURE: 65 MMHG | SYSTOLIC BLOOD PRESSURE: 109 MMHG | HEIGHT: 65 IN | HEART RATE: 68 BPM | WEIGHT: 126 LBS | TEMPERATURE: 98 F | OXYGEN SATURATION: 99 % | BODY MASS INDEX: 20.99 KG/M2

## 2023-10-20 DIAGNOSIS — Z09 ENCOUNTER FOR FOLLOW-UP EXAMINATION AFTER COMPLETED TREATMENT FOR CONDITIONS OTHER THAN MALIGNANT NEOPLASM: ICD-10-CM

## 2023-10-20 DIAGNOSIS — F64.0 TRANSSEXUALISM: ICD-10-CM

## 2023-10-20 PROCEDURE — 99213 OFFICE O/P EST LOW 20 MIN: CPT

## 2023-12-22 ENCOUNTER — APPOINTMENT (OUTPATIENT)
Dept: PLASTIC SURGERY | Facility: CLINIC | Age: 33
End: 2023-12-22

## 2024-04-19 ENCOUNTER — APPOINTMENT (OUTPATIENT)
Dept: PLASTIC SURGERY | Facility: CLINIC | Age: 34
End: 2024-04-19

## 2024-10-11 ENCOUNTER — APPOINTMENT (OUTPATIENT)
Dept: PLASTIC SURGERY | Facility: CLINIC | Age: 34
End: 2024-10-11

## (undated) DEVICE — FOLEY TRAY 16FR 5CC LF UMETER CLOSED

## (undated) DEVICE — BUR STRYKER EGG 4MM

## (undated) DEVICE — MIDAS REX LEGEND TAPERED SM BORE 2.3MM X 8CM

## (undated) DEVICE — SAW BLADE STRYKER RECIPROCATING 27MMX0.38MM

## (undated) DEVICE — DRAIN RESERVOIR FOR JACKSON PRATT 100CC CARDINAL

## (undated) DEVICE — SUT PLAIN GUT FAST ABSORBING 5-0 PC-1

## (undated) DEVICE — BLADE SURGICAL #11 CARBON

## (undated) DEVICE — SUT VICRYL 2-0 27" SH UNDYED

## (undated) DEVICE — LAP PAD 4 X 18"

## (undated) DEVICE — SYR LUER LOK 30CC

## (undated) DEVICE — DRSG MASTISOL

## (undated) DEVICE — TWIST DRILL 1.5MM DIA X 50MM W/NOTCH SGL USE ONLY

## (undated) DEVICE — SUT SILK 2-0 30" PSL

## (undated) DEVICE — Device

## (undated) DEVICE — SUT MONOCRYL PLUS 4-0 27" PS-1 UNDYED

## (undated) DEVICE — SYR LUER LOK 10CC

## (undated) DEVICE — SYR LUER LOK 5CC

## (undated) DEVICE — NDL 18G BLUNT FILL PINK

## (undated) DEVICE — SUT MONOCRYL 4-0 18" P-3 UNDYED

## (undated) DEVICE — BIPOLAR FORCEP SYMMETRY BAYONET STR 8.25" X 1.5MM (BLUE)

## (undated) DEVICE — MODEL IPS VERIFICATION

## (undated) DEVICE — APPLICATOR Q TIP 6" WOOD STEM

## (undated) DEVICE — BLADE SURGICAL #10 CARBON

## (undated) DEVICE — DRAPE TOWEL BLUE 17" X 24"

## (undated) DEVICE — AESCULAP SCALPFIX 10 CLIPS

## (undated) DEVICE — DRAPE MAGNETIC INSTRUMENT MEDIUM

## (undated) DEVICE — SUCTION YANKAUER BULBOUS TIP NO VENT

## (undated) DEVICE — PACK UPPER BODY

## (undated) DEVICE — DRAPE INSTRUMENT POUCH 6.75" X 11"

## (undated) DEVICE — SYR LUER LOK 50CC

## (undated) DEVICE — DRSG TUBE SPANDAGE 8 10YD

## (undated) DEVICE — SUCTION YANKAUER VITAL VUE

## (undated) DEVICE — BUR STRYKER CARBIDE CROSS CUT FISSURE 1.2MM

## (undated) DEVICE — DRSG XEROFORM 1 X 8"

## (undated) DEVICE — CATH ANGIOCATH 12G

## (undated) DEVICE — BLOCK SILICON

## (undated) DEVICE — RUBBERBAND STRL LTX FR 200/CA 3X1/8IN

## (undated) DEVICE — DRSG STERISTRIPS 0.5 X 4"

## (undated) DEVICE — SUT VICRYL 2-0 18" CP-2 UNDYED (POP-OFF)

## (undated) DEVICE — DRSG CURITY GAUZE SPONGE 4 X 4" 12-PLY

## (undated) DEVICE — RASP STRYKER LARGE TEAR CROSSCUT 14X7MM DISP

## (undated) DEVICE — VAGINAL PACKING 2"

## (undated) DEVICE — SUT ETHILON 5-0 18" PS-2

## (undated) DEVICE — ELCTR COLORADO 3CM

## (undated) DEVICE — COMPRESSION CHIN-NECK SMALL

## (undated) DEVICE — BLADE SURGICAL #15 CARBON

## (undated) DEVICE — SYR LUER LOK 1CC

## (undated) DEVICE — TAPE SILK 3"

## (undated) DEVICE — MODEL CRANIUM CRYSTAL IPS

## (undated) DEVICE — MARKING PEN W RULER

## (undated) DEVICE — SAW BLADE OSTEOMED VRO 12MM

## (undated) DEVICE — DRAIN JACKSON PRATT 7MM FLAT FULL NO TROCAR

## (undated) DEVICE — NDL SPINAL 22G X 3.5" (BLACK)

## (undated) DEVICE — DRSG TELFA 3 X 8

## (undated) DEVICE — DRSG KERLIX ROLL 4.5"

## (undated) DEVICE — DRAIN BLAKE 10FR ROUND

## (undated) DEVICE — NDL HYPO SAFE 25G X 1.5" (ORANGE)

## (undated) DEVICE — DRSG AQUAPLAST BLANK BLUSH 3 X 3"

## (undated) DEVICE — MODEL IPS TRANSFORM

## (undated) DEVICE — SUT CHROMIC 3-0 27" PS-2

## (undated) DEVICE — SUT PLAIN GUT 3-0 27" PS-2

## (undated) DEVICE — DRAPE IRRIGATION POUCH 19X23"

## (undated) DEVICE — SUT PLAIN GUT 4-0 18" PS-2

## (undated) DEVICE — SUT VICRYL 3-0 18" PS-2 UNDYED

## (undated) DEVICE — SYR LUER LOK 3CC

## (undated) DEVICE — BIPOLAR FORCEP KIRWAN JEWELERS STR 4" X 0.4MM W 12FT CORD (GREEN)

## (undated) DEVICE — DRSG GAUZE MOISTURIZER 0.5 OZ 4X8

## (undated) DEVICE — ELCTR BOVIE TIP BLADE VALLEYLAB 6.5"

## (undated) DEVICE — SUT CHROMIC 4-0 27" RB-1